# Patient Record
Sex: MALE | Race: BLACK OR AFRICAN AMERICAN | NOT HISPANIC OR LATINO | Employment: FULL TIME | URBAN - METROPOLITAN AREA
[De-identification: names, ages, dates, MRNs, and addresses within clinical notes are randomized per-mention and may not be internally consistent; named-entity substitution may affect disease eponyms.]

---

## 2018-08-02 ENCOUNTER — HOSPITAL ENCOUNTER (EMERGENCY)
Facility: HOSPITAL | Age: 38
End: 2018-08-03
Attending: EMERGENCY MEDICINE | Admitting: EMERGENCY MEDICINE
Payer: COMMERCIAL

## 2018-08-02 ENCOUNTER — APPOINTMENT (EMERGENCY)
Dept: CT IMAGING | Facility: HOSPITAL | Age: 38
End: 2018-08-02
Payer: COMMERCIAL

## 2018-08-02 DIAGNOSIS — K11.3 ABSCESS OF PAROTID GLAND: ICD-10-CM

## 2018-08-02 DIAGNOSIS — K11.20 PAROTIDITIS: Primary | ICD-10-CM

## 2018-08-02 LAB
ANION GAP SERPL CALCULATED.3IONS-SCNC: 8 MMOL/L (ref 4–13)
BASOPHILS # BLD AUTO: 0.1 THOUSANDS/ΜL (ref 0–0.1)
BASOPHILS NFR BLD AUTO: 1 % (ref 0–2)
BUN SERPL-MCNC: 12 MG/DL (ref 7–25)
CALCIUM SERPL-MCNC: 9.4 MG/DL (ref 8.6–10.5)
CHLORIDE SERPL-SCNC: 105 MMOL/L (ref 98–107)
CO2 SERPL-SCNC: 24 MMOL/L (ref 21–31)
CREAT SERPL-MCNC: 0.82 MG/DL (ref 0.7–1.3)
EOSINOPHIL # BLD AUTO: 0.4 THOUSAND/ΜL (ref 0–0.61)
EOSINOPHIL NFR BLD AUTO: 3 % (ref 0–5)
ERYTHROCYTE [DISTWIDTH] IN BLOOD BY AUTOMATED COUNT: 13.4 % (ref 11.5–14.5)
GFR SERPL CREATININE-BSD FRML MDRD: 131 ML/MIN/1.73SQ M
GLUCOSE SERPL-MCNC: 92 MG/DL (ref 65–99)
HCT VFR BLD AUTO: 42.4 % (ref 36.5–49.3)
HGB BLD-MCNC: 15 G/DL (ref 14–18)
LYMPHOCYTES # BLD AUTO: 2.6 THOUSANDS/ΜL (ref 0.6–4.47)
LYMPHOCYTES NFR BLD AUTO: 21 % (ref 21–51)
MCH RBC QN AUTO: 32.5 PG (ref 26–34)
MCHC RBC AUTO-ENTMCNC: 35.5 G/DL (ref 31–37)
MCV RBC AUTO: 92 FL (ref 81–99)
MONOCYTES # BLD AUTO: 1 THOUSAND/ΜL (ref 0.17–1.22)
MONOCYTES NFR BLD AUTO: 8 % (ref 2–12)
NEUTROPHILS # BLD AUTO: 8.3 THOUSANDS/ΜL (ref 1.4–6.5)
NEUTS SEG NFR BLD AUTO: 67 % (ref 42–75)
NRBC BLD AUTO-RTO: 0 /100 WBCS
PLATELET # BLD AUTO: 341 THOUSANDS/UL (ref 149–390)
PLATELET BLD QL SMEAR: ADEQUATE
PMV BLD AUTO: 7.5 FL (ref 8.6–11.7)
POTASSIUM SERPL-SCNC: 3.7 MMOL/L (ref 3.5–5.5)
RBC # BLD AUTO: 4.62 MILLION/UL (ref 4.3–5.9)
RBC MORPH BLD: NORMAL
SODIUM SERPL-SCNC: 137 MMOL/L (ref 134–143)
WBC # BLD AUTO: 12.3 THOUSAND/UL (ref 4.8–10.8)

## 2018-08-02 PROCEDURE — 96374 THER/PROPH/DIAG INJ IV PUSH: CPT

## 2018-08-02 PROCEDURE — 85025 COMPLETE CBC W/AUTO DIFF WBC: CPT | Performed by: EMERGENCY MEDICINE

## 2018-08-02 PROCEDURE — 70491 CT SOFT TISSUE NECK W/DYE: CPT

## 2018-08-02 PROCEDURE — 80048 BASIC METABOLIC PNL TOTAL CA: CPT | Performed by: EMERGENCY MEDICINE

## 2018-08-02 PROCEDURE — 36415 COLL VENOUS BLD VENIPUNCTURE: CPT | Performed by: EMERGENCY MEDICINE

## 2018-08-02 RX ORDER — KETOROLAC TROMETHAMINE 30 MG/ML
30 INJECTION, SOLUTION INTRAMUSCULAR; INTRAVENOUS ONCE
Status: COMPLETED | OUTPATIENT
Start: 2018-08-02 | End: 2018-08-02

## 2018-08-02 RX ADMIN — IOHEXOL 80 ML: 350 INJECTION, SOLUTION INTRAVENOUS at 22:50

## 2018-08-02 RX ADMIN — KETOROLAC TROMETHAMINE 30 MG: 30 INJECTION, SOLUTION INTRAMUSCULAR; INTRAVENOUS at 21:07

## 2018-08-03 ENCOUNTER — HOSPITAL ENCOUNTER (INPATIENT)
Facility: HOSPITAL | Age: 38
LOS: 1 days | Discharge: HOME | DRG: 115 | End: 2018-08-04
Attending: INTERNAL MEDICINE | Admitting: INTERNAL MEDICINE
Payer: COMMERCIAL

## 2018-08-03 VITALS
BODY MASS INDEX: 30.78 KG/M2 | RESPIRATION RATE: 18 BRPM | SYSTOLIC BLOOD PRESSURE: 116 MMHG | DIASTOLIC BLOOD PRESSURE: 70 MMHG | OXYGEN SATURATION: 96 % | TEMPERATURE: 98.4 F | HEIGHT: 70 IN | WEIGHT: 215 LBS | HEART RATE: 76 BPM

## 2018-08-03 DIAGNOSIS — R60.9 PAROTID SWELLING: ICD-10-CM

## 2018-08-03 DIAGNOSIS — L02.91 ABSCESS: Primary | ICD-10-CM

## 2018-08-03 PROBLEM — Z72.0 TOBACCO ABUSE: Status: ACTIVE | Noted: 2018-08-03

## 2018-08-03 LAB
ANION GAP SERPL CALCULATED.3IONS-SCNC: 8 MMOL/L (ref 4–13)
BUN SERPL-MCNC: 13 MG/DL (ref 5–25)
CALCIUM SERPL-MCNC: 9.2 MG/DL (ref 8.3–10.1)
CHLORIDE SERPL-SCNC: 104 MMOL/L (ref 100–108)
CO2 SERPL-SCNC: 24 MMOL/L (ref 21–32)
CREAT SERPL-MCNC: 1.12 MG/DL (ref 0.6–1.3)
ERYTHROCYTE [DISTWIDTH] IN BLOOD BY AUTOMATED COUNT: 13.8 % (ref 11.6–15.1)
GFR SERPL CREATININE-BSD FRML MDRD: 96 ML/MIN/1.73SQ M
GLUCOSE SERPL-MCNC: 100 MG/DL (ref 65–140)
HCT VFR BLD AUTO: 43.4 % (ref 36.5–49.3)
HGB BLD-MCNC: 15 G/DL (ref 12–17)
MCH RBC QN AUTO: 32.6 PG (ref 26.8–34.3)
MCHC RBC AUTO-ENTMCNC: 34.6 G/DL (ref 31.4–37.4)
MCV RBC AUTO: 94 FL (ref 82–98)
PLATELET # BLD AUTO: 317 THOUSANDS/UL (ref 149–390)
PMV BLD AUTO: 9.7 FL (ref 8.9–12.7)
POTASSIUM SERPL-SCNC: 4.1 MMOL/L (ref 3.5–5.3)
RBC # BLD AUTO: 4.6 MILLION/UL (ref 3.88–5.62)
SODIUM SERPL-SCNC: 136 MMOL/L (ref 136–145)
WBC # BLD AUTO: 13.18 THOUSAND/UL (ref 4.31–10.16)

## 2018-08-03 PROCEDURE — 87070 CULTURE OTHR SPECIMN AEROBIC: CPT | Performed by: OTOLARYNGOLOGY

## 2018-08-03 PROCEDURE — 0C983ZZ DRAINAGE OF RIGHT PAROTID GLAND, PERCUTANEOUS APPROACH: ICD-10-PCS | Performed by: OTOLARYNGOLOGY

## 2018-08-03 PROCEDURE — 87077 CULTURE AEROBIC IDENTIFY: CPT | Performed by: OTOLARYNGOLOGY

## 2018-08-03 PROCEDURE — 99285 EMERGENCY DEPT VISIT HI MDM: CPT

## 2018-08-03 PROCEDURE — 87205 SMEAR GRAM STAIN: CPT | Performed by: OTOLARYNGOLOGY

## 2018-08-03 PROCEDURE — 87181 SC STD AGAR DILUTION PER AGT: CPT | Performed by: OTOLARYNGOLOGY

## 2018-08-03 PROCEDURE — 85027 COMPLETE CBC AUTOMATED: CPT | Performed by: PHYSICIAN ASSISTANT

## 2018-08-03 PROCEDURE — 80048 BASIC METABOLIC PNL TOTAL CA: CPT | Performed by: PHYSICIAN ASSISTANT

## 2018-08-03 PROCEDURE — 99222 1ST HOSP IP/OBS MODERATE 55: CPT | Performed by: INTERNAL MEDICINE

## 2018-08-03 RX ORDER — SODIUM CHLORIDE 9 MG/ML
125 INJECTION, SOLUTION INTRAVENOUS CONTINUOUS
Status: DISCONTINUED | OUTPATIENT
Start: 2018-08-03 | End: 2018-08-04 | Stop reason: HOSPADM

## 2018-08-03 RX ORDER — LIDOCAINE HYDROCHLORIDE AND EPINEPHRINE 10; 10 MG/ML; UG/ML
5 INJECTION, SOLUTION INFILTRATION; PERINEURAL ONCE
Status: COMPLETED | OUTPATIENT
Start: 2018-08-03 | End: 2018-08-03

## 2018-08-03 RX ORDER — DEXAMETHASONE SODIUM PHOSPHATE 10 MG/ML
10 INJECTION, SOLUTION INTRAMUSCULAR; INTRAVENOUS EVERY 8 HOURS SCHEDULED
Status: COMPLETED | OUTPATIENT
Start: 2018-08-03 | End: 2018-08-04

## 2018-08-03 RX ORDER — ACETAMINOPHEN 325 MG/1
650 TABLET ORAL EVERY 6 HOURS PRN
Status: DISCONTINUED | OUTPATIENT
Start: 2018-08-03 | End: 2018-08-04 | Stop reason: HOSPADM

## 2018-08-03 RX ORDER — MORPHINE SULFATE 4 MG/ML
4 INJECTION, SOLUTION INTRAMUSCULAR; INTRAVENOUS
Status: DISCONTINUED | OUTPATIENT
Start: 2018-08-03 | End: 2018-08-04 | Stop reason: HOSPADM

## 2018-08-03 RX ORDER — ONDANSETRON 2 MG/ML
4 INJECTION INTRAMUSCULAR; INTRAVENOUS EVERY 6 HOURS PRN
Status: DISCONTINUED | OUTPATIENT
Start: 2018-08-03 | End: 2018-08-04 | Stop reason: HOSPADM

## 2018-08-03 RX ADMIN — SODIUM CHLORIDE 125 ML/HR: 9 INJECTION, SOLUTION INTRAVENOUS at 17:23

## 2018-08-03 RX ADMIN — LIDOCAINE HYDROCHLORIDE,EPINEPHRINE BITARTRATE 5 ML: 10; .01 INJECTION, SOLUTION INFILTRATION; PERINEURAL at 02:39

## 2018-08-03 RX ADMIN — SODIUM CHLORIDE 3 G: 9 INJECTION, SOLUTION INTRAVENOUS at 21:05

## 2018-08-03 RX ADMIN — SODIUM CHLORIDE 125 ML/HR: 9 INJECTION, SOLUTION INTRAVENOUS at 04:05

## 2018-08-03 RX ADMIN — SODIUM CHLORIDE 3 G: 9 INJECTION, SOLUTION INTRAVENOUS at 04:26

## 2018-08-03 RX ADMIN — SODIUM CHLORIDE 125 ML/HR: 9 INJECTION, SOLUTION INTRAVENOUS at 14:31

## 2018-08-03 RX ADMIN — SODIUM CHLORIDE 3 G: 9 INJECTION, SOLUTION INTRAVENOUS at 08:56

## 2018-08-03 RX ADMIN — DEXAMETHASONE SODIUM PHOSPHATE 10 MG: 10 INJECTION, SOLUTION INTRAMUSCULAR; INTRAVENOUS at 18:13

## 2018-08-03 RX ADMIN — SODIUM CHLORIDE 3 G: 9 INJECTION, SOLUTION INTRAVENOUS at 14:34

## 2018-08-03 RX ADMIN — DEXAMETHASONE SODIUM PHOSPHATE 10 MG: 10 INJECTION, SOLUTION INTRAMUSCULAR; INTRAVENOUS at 11:03

## 2018-08-03 NOTE — H&P
History and Physical - [age] [facility] Internal Medicine    Patient Information: Nuria Hoffman  41 y o  male MRN: [id number]  Unit/Bed#: ED 18 Encounter: [id number]  Admitting Physician: Magdi Herrera PA-C  PCP: No primary care provider on file  Date of Admission:  08/03/18    Assessment/Plan:    Hospital Problem List:     Principal Problem:    Parotid swelling  Active Problems:    Tobacco abuse      Plan for the Primary Problem(s):  · Parotid swelling  · Admit to med/surg  Seen by ENT in ED and had aspiration  Per Dr Liliya Montes keep NPO for now until repeat exam in AM  Start Unasyn  Plan for Additional Problems:   · Tobacco use- declined nicotine patch    VTE Prophylaxis: Pharmacologic VTE Prophylaxis contraindicated due to awaiting repeat ENT eval  / sequential compression device and reason for no mechanical VTE prophylaxis low risk   Code Status: full code  POLST: There is no POLST form on file for this patient (pre-hospital)    Anticipated Length of Stay:  Patient will be admitted on an Inpatient basis with an anticipated length of stay of  Greater than 2 midnights  Justification for Hospital Stay: patient requires IV antibiotics    Total Time for Visit, including Counseling / Coordination of Care: 45 minutes  Greater than 50% of this total time spent on direct patient counseling and coordination of care  Chief Complaint:   Sore throat    History of Present Illness:    Matthew Girard  is a 41 y o  male who presents with sore throat and swelling for 1 day  Denies fever  No trauma  He does have broken teeth and does not go to the dentist  He denies sick contacts  He denies frequent sore throats  He does smoke  He denies any other medical issues    Review of Systems:    Review of Systems   Constitutional: Negative  HENT: Positive for dental problem and sore throat  Eyes: Negative  Respiratory: Negative  Cardiovascular: Negative  Gastrointestinal: Negative  Endocrine: Negative  Genitourinary: Negative  Musculoskeletal: Negative  Skin: Negative  Allergic/Immunologic: Negative  Neurological: Negative  Hematological: Negative  Psychiatric/Behavioral: Negative  Past Medical and Surgical History:     History reviewed  No pertinent past medical history  Past Surgical History:   Procedure Laterality Date    APPENDECTOMY      JOINT REPLACEMENT         Meds/Allergies:    Prior to Admission medications    Not on File     I have reviewed home medications with patient personally  Allergies: No Known Allergies    Social History:     Marital Status:    Occupation: unemployed  Patient Pre-hospital Living Situation: lives with gabriela and her kids  Patient Pre-hospital Level of Mobility: ambulatory  Patient Pre-hospital Diet Restrictions: none  Substance Use History:   History   Alcohol Use No     History   Smoking Status    Current Every Day Smoker    Packs/day: 0 50   Smokeless Tobacco    Never Used     History   Drug Use    Types: Marijuana       Family History:    non-contributory    Physical Exam:     Vitals:   Blood Pressure: 130/80 (08/03/18 0152)  Pulse: 77 (08/03/18 0152)  Temperature: 97 8 °F (36 6 °C) (08/03/18 0152)  Temp Source: Oral (08/03/18 0152)  Respirations: 18 (08/03/18 0152)  SpO2: 97 % (08/03/18 0152)    Physical Exam   Constitutional: He is oriented to person, place, and time  He appears well-developed and well-nourished  No distress  HENT:   Head: Normocephalic and atraumatic  Poor dentition with broken teeth   Eyes: Conjunctivae are normal  Pupils are equal, round, and reactive to light  Neck: No tracheal deviation present  Right neck swelling  Able to swallow   Cardiovascular: Normal rate and regular rhythm  Pulmonary/Chest: Effort normal and breath sounds normal  No respiratory distress  He has no wheezes  Abdominal: Soft  Bowel sounds are normal  He exhibits no distension  There is no tenderness     Musculoskeletal: Normal range of motion  He exhibits no edema, tenderness or deformity  Neurological: He is alert and oriented to person, place, and time  Skin: Skin is warm and dry  No rash noted  He is not diaphoretic  No erythema  No pallor  Psychiatric: He has a normal mood and affect  His behavior is normal    Vitals reviewed  Additional Data:     Lab Results: I have personally reviewed pertinent reports  Results from last 7 days  Lab Units 08/02/18  2107   WBC Thousand/uL 12 30*   HEMOGLOBIN g/dL 15 0   HEMATOCRIT % 42 4   PLATELETS Thousands/uL 341   NEUTROS PCT % 67   LYMPHS PCT % 21   MONOS PCT % 8   EOS PCT % 3       Results from last 7 days  Lab Units 08/02/18  2144   SODIUM mmol/L 137   POTASSIUM mmol/L 3 7   CHLORIDE mmol/L 105   CO2 mmol/L 24   BUN mg/dL 12   CREATININE mg/dL 0 82   CALCIUM mg/dL 9 4   GLUCOSE RANDOM mg/dL 92           Imaging: I have personally reviewed pertinent reports  Ct Soft Tissue Neck W Contrast    Result Date: 8/2/2018  Narrative: INDICATION:  Neck mass, nonpulsatile, solitary  Right lateral neck discomfort with swelling and pain when eating solids  ORDERING PROVIDER:  Truong Encinas  TECHNIQUE:  CT of the soft tissues of the neck was performed with intravenous contrast   Omnipaque 350 80 mL was administered intravenously  Automated mA/kV exposure control was utilized and patient examination was performed in strict accordance with principles of ALARA  RADIATION AMOUNT:  332 3 mGy-cm  COMPARISON:  None Available  FINDINGS: There is a lesion in the right parotid gland image 35 series 2 which measures 1 9 x 1 7 cm in size  It appears to have decreased attenuation centrally  There are three presumed lymph nodes about the posterior aspect of the superficial lobe of the right parotid gland  Largest measures approximately 9 mm short axis dimension image 42 series 2    Small soft tissue lesion within the right parotid gland superficial lobe image 27 series 2 measures 5 mm in size  This is indeterminate  Similar smaller lesion noted within the superficial portion of left parotid gland  Mild overlying subcutaneous edema is present  Submandibular and thyroid glands are unremarkable  The airway is patent and symmetric throughout  There are no suspicious bony lesions  The visualized intracranial portions and lung apices are within normal limits  The visualized paranasal sinuses and mastoid air cells are clear  Impression: Mixed attenuation lesion within the superficial lobe of the right parotid gland  Adjacent presumed reactive lymph nodes are present  Adjacent edema is present  Clinical correlation for signs of infection recommended  Findings are suspected to be related to parotiditis with abscess  ENT consultation suggested  Small solid density is noted within the parotid glands bilaterally which are indeterminate, but likely benign parotid lesions  Signed by Adriana Mittal MD      EKG, Pathology, and Other Studies Reviewed on Admission:   · EKG: none    Allscripts / Epic Records Reviewed: Yes     ** Please Note: This note has been constructed using a voice recognition system   **

## 2018-08-03 NOTE — ED NOTES
Dr Sebas Alston contacted through office answering service  Aware that patient has arrived in ED  Per answering service, Dr Sebas Alston will be in soon to see patient        Arlet Oconnell RN  08/03/18 3152

## 2018-08-03 NOTE — EMTALA/ACUTE CARE TRANSFER
[address] [facility] [address]  [address] [place] [address] [address]  [phone]  Dept: [phone]      [address] TRANSFER CONSENT    NAME Matthew Urbina   1980                              MRN [id number]    I have been informed of my rights regarding examination, treatment, and transfer   by Dr Radha France MD    Benefits: Specialized equipment and/or services available at the receiving facility (Include comment)________________________    Risks: Potential for delay in receiving treatment, Potential deterioration of medical condition, Increased discomfort during transfer, Possible worsening of condition or death during transfer      Consent for Transfer:  I acknowledge that my medical condition has been evaluated and explained to me by the emergency department physician or other qualified medical person and/or my attending physician, who has recommended that I be transferred to the service of  Accepting Physician: DR OWUSU [facility] TRANSFER)/DR SOLORZANO(IN ADMIT) at [address] Name, [facility]  The above potential benefits of such transfer, the potential risks associated with such transfer, and the probable risks of not being transferred have been explained to me, and I fully understand them  The doctor has explained that, in my case, the benefits of transfer outweigh the risks  I agree to be transferred  I authorize the performance of emergency medical procedures and treatments upon me in both transit and upon arrival at the receiving facility  Additionally, I authorize the release of any and all medical records to the receiving facility and request they be transported with me, if possible  I understand that the safest mode of transportation during a medical emergency is an ambulance and that the Hospital advocates the use of this mode of transport   Risks of traveling to the receiving facility by car, including absence of medical control, life sustaining equipment, such as oxygen, and medical personnel has been explained to me and I fully understand them  (LOYDA CORRECT BOX BELOW)  [  ]  I consent to the stated transfer and to be transported by ambulance/helicopter  [  Demond Pascual consent to the stated transfer, but refuse transportation by ambulance and accept full responsibility for my transportation by car  I understand the risks of non-ambulance transfers and I exonerate the Hospital and its staff from any deterioration in my condition that results from this refusal     X___________________________________________    DATE  18  TIME________  Signature of patient or legally responsible individual signing on patient behalf           RELATIONSHIP TO PATIENT_________________________          Provider Certification    NAME Matthew Ruvalcaba   1980                              MRN [id number]    A medical screening exam was performed on the above named patient  Based on the examination:    Condition Necessitating Transfer The primary encounter diagnosis was Parotiditis  A diagnosis of Abscess of parotid gland was also pertinent to this visit      Patient Condition: The patient has been stabilized such that within reasonable medical probability, no material deterioration of the patient condition or the condition of the unborn child(lisa) is likely to result from the transfer    Reason for Transfer: Level of Care needed not available at this facility    Transfer Requirements: [address] [address]   · Space available and qualified personnel available for treatment as acknowledged by    · Agreed to accept transfer and to provide appropriate medical treatment as acknowledged by       DR OWUSU [facility] TRANSFER)/DR SOLORZANO(IN ADMIT)  · Appropriate medical records of the examination and treatment of the patient are provided at the time of transfer   [address] _______  · Transfer will be performed by qualified personnel from    and appropriate transfer equipment as required, including the use of necessary and appropriate life support measures  Provider Certification: I have examined the patient and explained the following risks and benefits of being transferred/refusing transfer to the patient/family:  General risk, such as traffic hazards, adverse weather conditions, rough terrain or turbulence, possible failure of equipment (including vehicle or aircraft), or consequences of actions of persons outside the control of the transport personnel      Based on these reasonable risks and benefits to the patient and/or the unborn child(lisa), and based upon the information available at the time of the patients examination, I certify that the medical benefits reasonably to be expected from the provision of appropriate medical treatments at another medical facility outweigh the increasing risks, if any, to the individuals medical condition, and in the case of labor to the unborn child, from effecting the transfer      X____________________________________________ DATE 08/03/18        TIME_______      ORIGINAL - SEND TO MEDICAL RECORDS   COPY - SEND WITH PATIENT DURING TRANSFER

## 2018-08-03 NOTE — CONSULTS
Consultation - ENT   Luke May  41 y o  male MRN: [id number]  Unit/Bed#: ED 18 Encounter: [id number]      Assessment/Plan     Assessment:  Right parotitis with right parotid abscess  Plan:  Patient will be admitted with IV antibiotic therapy  At this point I do not feel that he needs to be taken to the operating room  I attempted bedside aspiration with skin can't evidence of pus using an 18 gauge needle and trying to aspirate in approximately 6 locations  At this time he will remain NPO until evaluated by Dr Bill Munoz in the morning to make sure that there is clinical improvement  [facility] service was  contacted and they will admit to their service  He will require IV fluids and IV Unasyn and we will see the response  Physician Requesting Consult: No att  providers found  Reason for Consult / Principal Problem:  Right parotid abscess  HPI: Luke May  is a 41y o  year old male who presents with a 2 day history of right-sided neck pain  Today when he awoke there was significant swelling in the area anterior and inferior to the right ear and he was having difficulty opening his mouth  He went to the emergency room earlier [facility] [place], operated by [facility] and a CT scan was completed  This demonstrated a 1 9 x 1 7 cm abscess in the right parotid gland  Was called by the transfer center and accepted the transfer  Consults    Review of Systems   Constitutional:        Admits to right ear and neck pain along with difficulty opening his mouth  HENT: Positive for ear discharge (Right-sided)  Negative for sore throat, trouble swallowing and voice change  Historical Information   History reviewed  No pertinent past medical history    Past Surgical History:   Procedure Laterality Date    APPENDECTOMY      JOINT REPLACEMENT       Social History   History   Alcohol Use No     History   Drug Use    Types: Marijuana     History   Smoking Status    Current Every Day Smoker    Packs/day: 0 50   Smokeless Tobacco    Never Used     Family History: History reviewed  No pertinent family history  Meds/Allergies   current meds:   Current Facility-Administered Medications   Medication Dose Route Frequency    acetaminophen (TYLENOL) tablet 650 mg  650 mg Oral Q6H PRN    ondansetron (ZOFRAN) injection 4 mg  4 mg Intravenous Q6H PRN    sodium chloride 0 9 % infusion  100 mL/hr Intravenous Continuous    and PTA meds:   None       No Known Allergies    Objective     No intake or output data in the 24 hours ending 08/03/18 0309    Invasive Devices     Peripheral Intravenous Line            Peripheral IV 08/02/18 Left Hand less than 1 day                Physical Exam   Constitutional: He appears well-developed and well-nourished  No distress  HENT:   Ears:  External canals and ears are normal  Tympanic membranes are intact without effusion, retraction, or perforation  Nasal:  External nose is normal   Septum is midline  There is no mucopus in the nose  Turbinates are normal in color and shape  Mucous membranes are moist     Oral cavity:  Lips are normal   There is evidence of trismus and the patient can open the mouth approximately 1 5 fingerbreadths  Dentition demonstrates some discoloration  The entire left lower role of molars is ground down completely  On the right lower molar region there is a cracked tooth  Upper dentition is in good repair  Gingival surfaces are all normal there is no swelling or redness of the sites  Floor of mouth is soft  There is good mobility of the tongue  Gingival sulcus is normal   There is almost no saliva from any of the salivary ducts  No expression of pus from the parotid duct itself when it was palpated  Soft palate is normal uvula is midline  Tonsils are grade 2  Posterior pharynx is normal without any mucopus draining from above  Neck: Normal range of motion  No JVD present  No tracheal deviation present  No thyromegaly present  Right side of the neck with visible swelling in the pre and infra-auricular regions  There is tenderness when palpated along the angle of the jaw  There is no ballotable lesion  The skin is not erythematous  He does have pain with palpation in the region  Pulmonary/Chest: No stridor  Skin: He is not diaphoretic  Consent was signed and charted for aspiration of the right parotid bed abscess  The area was cleansed with alcohol and then 1% xylocaine with 1 to 911562 epinephrine was injected into the site to a total of approximately 2 5 mL  Then using a 5 mL syringe with an 18 gauge needle I attempted to aspirate pus from the parotid bed  Despite 3 attempts with multiple directions of angulation of the needle I was only able to aspirate a very scant amount of mucopus into the syringe  This was sent for both aerobic and anaerobic cultures  Pressure was held on the wound after completion and there was no evidence of bleeding or sialocele formation  Lab Results:   CBC:   Lab Results   Component Value Date    WBC 12 30 (H) 08/02/2018    HGB 15 0 08/02/2018    HCT 42 4 08/02/2018    MCV 92 08/02/2018     08/02/2018    MCH 32 5 08/02/2018    MCHC 35 5 08/02/2018    RDW 13 4 08/02/2018    MPV 7 5 (L) 08/02/2018    NRBC 0 08/02/2018   , CMP:   Lab Results   Component Value Date     08/02/2018    K 3 7 08/02/2018     08/02/2018    CO2 24 08/02/2018    ANIONGAP 8 08/02/2018    BUN 12 08/02/2018    CREATININE 0 82 08/02/2018    GLUCOSE 92 08/02/2018    CALCIUM 9 4 08/02/2018    EGFR 131 08/02/2018   , Coags: No results found for: PT, PTT, INR  Imaging Studies: I have personally reviewed pertinent films in PACS  EKG, Pathology, and Other Studies: I have personally reviewed pertinent reports        Code Status: No Order  Advance Directive and Living Will:      Power of :    POLST:      None

## 2018-08-03 NOTE — ED PROVIDER NOTES
History  Chief Complaint   Patient presents with    Sore Throat - Complicated    Neck Swelling     38 YOM WITH SWELLING AND PAIN TO THE RIGHT SIDE OF FACE AND NECK  FOR THE PAST 24 HOURS  NO TRAUMA OR DENTAL PAIN  NO SOB  ASSOCIATED DYSPHAGIA            None       History reviewed  No pertinent past medical history  Past Surgical History:   Procedure Laterality Date    APPENDECTOMY      JOINT REPLACEMENT         History reviewed  No pertinent family history  I have reviewed and agree with the history as documented  Social History   Substance Use Topics    Smoking status: Current Every Day Smoker     Packs/day: 0 50    Smokeless tobacco: Never Used    Alcohol use No        Review of Systems   Constitutional: Negative for chills and fever  HENT: Positive for facial swelling and trouble swallowing  Negative for dental problem, drooling, ear pain, rhinorrhea and sore throat  Eyes: Negative for pain, redness and visual disturbance  Respiratory: Negative for cough and shortness of breath  Cardiovascular: Negative for chest pain and leg swelling  Gastrointestinal: Negative for abdominal pain, diarrhea, nausea and vomiting  Genitourinary: Negative for dysuria, flank pain, frequency and urgency  Musculoskeletal: Negative for back pain, myalgias and neck pain  Skin: Negative for rash  Neurological: Negative for dizziness, weakness, light-headedness and headaches  Hematological: Negative  Psychiatric/Behavioral: Negative for agitation, confusion and suicidal ideas  The patient is not nervous/anxious  All other systems reviewed and are negative  Physical Exam  Physical Exam   HENT:   THERE IS A TENDER LARGE POORLY DEFINED MASS TO THE RIGHT SUBMANDIBULAR ANGLE WITHOUT OVERLYING DERMAL CHANGES  THE OC EXAM IS LIMITED BY JAW EXCURSION  THE DENTITION BY LIMITED EXAM ARE UNREMARKABLE   Neck: Normal range of motion  Neck supple         Vital Signs  ED Triage Vitals [08/02/18 1935] Temperature Pulse Respirations Blood Pressure SpO2   99 °F (37 2 °C) 105 18 147/89 96 %      Temp Source Heart Rate Source Patient Position - Orthostatic VS BP Location FiO2 (%)   Temporal Monitor Sitting Right arm --      Pain Score       Worst Possible Pain           Vitals:    08/02/18 1935 08/02/18 2030 08/02/18 2230   BP: 147/89 121/66 108/63   Pulse: 105 89 75   Patient Position - Orthostatic VS: Sitting         Visual Acuity      ED Medications  Medications   ketorolac (TORADOL) injection 30 mg (30 mg Intravenous Given 8/2/18 2107)   iohexol (OMNIPAQUE) 350 MG/ML injection (SINGLE-DOSE) 100 mL (80 mL Intravenous Given 8/2/18 2250)       Diagnostic Studies  Results Reviewed     Procedure Component Value Units Date/Time    Basic metabolic panel [[id number]] Collected:  08/02/18 2144    Lab Status:  Final result Specimen:  Blood from Arm, Left Updated:  08/02/18 2223     Sodium 137 mmol/L      Potassium 3 7 mmol/L      Chloride 105 mmol/L      CO2 24 mmol/L      Anion Gap 8 mmol/L      BUN 12 mg/dL      Creatinine 0 82 mg/dL      Glucose 92 mg/dL      Calcium 9 4 mg/dL      eGFR 131 ml/min/1 73sq m     Narrative:         National Kidney Disease Education Program recommendations are as follows:  GFR calculation is accurate only with a steady state creatinine  Chronic Kidney disease less than 60 ml/min/1 73 sq  meters  Kidney failure less than 15 ml/min/1 73 sq  meters      CBC and differential [[id number]]  (Abnormal) Collected:  08/02/18 2107    Lab Status:  Final result Specimen:  Blood from Arm, Left Updated:  08/02/18 2129     WBC 12 30 (H) Thousand/uL      RBC 4 62 Million/uL      Hemoglobin 15 0 g/dL      Hematocrit 42 4 %      MCV 92 fL      MCH 32 5 pg      MCHC 35 5 g/dL      RDW 13 4 %      MPV 7 5 (L) fL      Platelets 465 Thousands/uL      nRBC 0 /100 WBCs      Neutrophils Relative 67 %      Lymphocytes Relative 21 %      Monocytes Relative 8 %      Eosinophils Relative 3 %      Basophils Relative 1 % Neutrophils Absolute 8 30 (H) Thousands/µL      Lymphocytes Absolute 2 60 Thousands/µL      Monocytes Absolute 1 00 Thousand/µL      Eosinophils Absolute 0 40 Thousand/µL      Basophils Absolute 0 10 Thousands/µL                  CT soft tissue neck w contrast    (Results Pending)              Procedures  Procedures       Phone Contacts  ED Phone Contact    ED Course   AT 12:00AM, THE CT OF NECK SOFT TISSUES RETURNED SHOWING PAROTIDITIS WITH AN ABSCESS  THE PATIENT DEMONSTRATES DYSPHAGIA AND TRISMUS  WBCs 12 3 LOW GRADE TEMPERATURE  CLINICAL AND RADIOLOGIC FINDINGS SUGGEST INPATIENT TREATMENT    AT 12:00AM, A TRANSFER REQUEST WAS INITIATED  [facility] [place] Time    Disposition  Final diagnoses:   None     ED Disposition     None      Follow-up Information    None         Patient's Medications    No medications on file     No discharge procedures on file      ED Provider  Electronically Signed by           Minor Gamboa MD  08/02/18 8393       Minor Gamboa MD  08/03/18 6807

## 2018-08-03 NOTE — PROGRESS NOTES
Progress Note - ENT Surgery   Matthew Aguilar Finders  41 y o  male MRN: [id number]  Unit/Bed#: E5 -01 Encounter: [id number]    Assessment:  Right parotitis, ? Abscess vs infected cyst vs intraparotid lymph node    Plan:  Patient s/p FNA with scant thick discharge recovered earlier this AM  Some confusion with specimen but lab/micro was contacted by nurse while I was present and specimen was accepted and C&S is pending  Patient does not require I&D at this time  Will follow response to medical therapy   Trismus improved and patient reports decreased pain since admission  Continue IV antibiotics  Add Decadron 10 mg q 8 hours x 3 doses  Advance patient to regular diet  Subjective/Objective   Chief Complaint: Right parotid swelling x 2 days    Subjective: Decreased pain  Patient is hungry  Objective:     Blood pressure 119/77, pulse 73, temperature 99 °F (37 2 °C), temperature source Temporal, resp  rate 18, height 5' 10" (1 778 m), weight 100 kg (220 lb 7 4 oz), SpO2 93 %  ,Body mass index is 31 63 kg/m²  No intake or output data in the 24 hours ending 08/03/18 0929    Invasive Devices     Peripheral Intravenous Line            Peripheral IV 08/02/18 Left Hand less than 1 day                Physical Exam: Head: Normocephalic, without obvious abnormality, atraumatic  Eyes: conjunctivae/corneas clear  PERRL, EOM's intact  Fundi benign  Ears: normal TM's and external ear canals both ears  Nose: no discharge, turbinates normal  Throat: abnormal findings: dentition: poor dentition, Trismus improved with at least 2 finger breath width this AM    Neck: mild anterior cervical adenopathy and Right parotid tail indurated with no fluctuance; mildly tender to palpation  No rubor  Trachea midline, no stridor, no labored breathing    Lab, Imaging and other studies:I have personally reviewed pertinent lab results    Slight increase in WBC s/p FNA early this AM

## 2018-08-04 VITALS
SYSTOLIC BLOOD PRESSURE: 161 MMHG | WEIGHT: 220.46 LBS | HEART RATE: 84 BPM | TEMPERATURE: 98 F | RESPIRATION RATE: 18 BRPM | OXYGEN SATURATION: 96 % | DIASTOLIC BLOOD PRESSURE: 89 MMHG | BODY MASS INDEX: 31.56 KG/M2 | HEIGHT: 70 IN

## 2018-08-04 PROCEDURE — 99238 HOSP IP/OBS DSCHRG MGMT 30/<: CPT | Performed by: INTERNAL MEDICINE

## 2018-08-04 RX ORDER — PREDNISONE 10 MG/1
TABLET ORAL
Qty: 20 TABLET | Refills: 0 | Status: SHIPPED | OUTPATIENT
Start: 2018-08-04

## 2018-08-04 RX ORDER — AMOXICILLIN AND CLAVULANATE POTASSIUM 875; 125 MG/1; MG/1
1 TABLET, FILM COATED ORAL EVERY 12 HOURS SCHEDULED
Qty: 20 TABLET | Refills: 0 | Status: SHIPPED | OUTPATIENT
Start: 2018-08-04 | End: 2018-08-14

## 2018-08-04 RX ADMIN — SODIUM CHLORIDE 3 G: 9 INJECTION, SOLUTION INTRAVENOUS at 02:50

## 2018-08-04 RX ADMIN — DEXAMETHASONE SODIUM PHOSPHATE 10 MG: 10 INJECTION, SOLUTION INTRAMUSCULAR; INTRAVENOUS at 02:50

## 2018-08-04 RX ADMIN — SODIUM CHLORIDE 125 ML/HR: 9 INJECTION, SOLUTION INTRAVENOUS at 02:10

## 2018-08-04 RX ADMIN — SODIUM CHLORIDE 3 G: 9 INJECTION, SOLUTION INTRAVENOUS at 09:41

## 2018-08-04 NOTE — DISCHARGE SUMMARY
Discharge- Luke May  1980, 41 y o  male MRN: [id number]    Unit/Bed#: E5 -01 Encounter: [id number]      Admitting Provider:  Zoila Montiel MD  Discharge Provider:  Florin Abebe DO  Admission Date: 8/3/2018       Discharge Date: 08/04/18   LOS: 1  Primary Care Physician at Discharge:  None    HOSPITAL COURSE:  Luke May  is a 41 y o  male who presented the hospital sore throat and facial swelling x1 day  He had broken teeth and attributed that  In the emergency department he obtain CT scan and was found to have parotitis and had trismus  He was started on Unasyn and eventually on dexamethasone by ENT  He had much improvement without need for evacuation or drainage  At time of discharge he has been tolerating solid meals without difficulty  He understands the importance of following up with ENT within the next week  DISCHARGE DIAGNOSES    Principal Problem:    Parotid swelling  Active Problems:    Tobacco abuse  Resolved Problems:    * No resolved hospital problems  Kristina Render   Dr Khan Core - ENT    PROCEDURES PERFORMED  Ct Soft Tissue Neck W Contrast  Result Date: 8/2/2018  Impression: Mixed attenuation lesion within the superficial lobe of the right parotid gland  Adjacent presumed reactive lymph nodes are present  Adjacent edema is present  Clinical correlation for signs of infection recommended  Findings are suspected to be related to parotiditis with abscess  ENT consultation suggested  Small solid density is noted within the parotid glands bilaterally which are indeterminate, but likely benign parotid lesions   Signed by Chris Bocanegra MD    Other Pertinent Test Results    Results from last 7 days  Lab Units 08/03/18  0456 08/02/18  2107   WBC Thousand/uL 13 18* 12 30*   HEMOGLOBIN g/dL 15 0 15 0   HEMATOCRIT % 43 4 42 4   MCV fL 94 92   PLATELETS Thousands/uL 317 341       Results from last 7 days  Lab Units 08/03/18  0456 08/02/18  2144   SODIUM mmol/L 136 137   POTASSIUM mmol/L 4 1 3 7   CHLORIDE mmol/L 104 105   CO2 mmol/L 24 24   ANION GAP mmol/L 8 8   BUN mg/dL 13 12   CREATININE mg/dL 1 12 0 82   CALCIUM mg/dL 9 2 9 4   EGFR ml/min/1 73sq m 96 131   GLUCOSE RANDOM mg/dL 100 92         Results from last 7 days  Lab Units 08/03/18  0324 08/03/18  0323   GRAM STAIN RESULT  1+ Mononuclear Cells  2+ Gram positive cocci in pairs  1+ Gram positive cocci in clusters  Gram positive cocci in chains 3+ Polys  No bacteria seen   WOUND CULTURE  Culture too young- will reincubate Culture too young- will reincubate       PHYSICAL EXAM:  Vitals:   Blood Pressure: 161/89 (08/04/18 0736)  Pulse: 84 (08/04/18 0736)  Temperature: 98 °F (36 7 °C) (08/04/18 0736)  Temp Source: Temporal (08/04/18 0736)  Respirations: 18 (08/04/18 0736)  Height: 5' 10" (177 8 cm) (08/03/18 0352)  Weight - Scale: 100 kg (220 lb 7 4 oz) (08/03/18 0352)  SpO2: 96 % (08/04/18 0736)    General appearance: alert, appears stated age and cooperative  Head: atraumatic  Eyes: conjunctivae/corneas clear  PERRL, EOM's intact  Lungs: clear to auscultation bilaterally  Heart: regular rate and rhythm  Abdomen: soft, non-tender; bowel sounds normal; no masses,  no organomegaly  Back: symmetric, no curvature  ROM normal  No CVA tenderness  Extremities: extremities normal, atraumatic, no cyanosis or edema  Neurologic: Grossly normal    Planned Re-admission: No  Discharge Disposition: Home      Test Results Pending at Discharge:    Order Current Status    Wound culture and Gram stain Preliminary result    Wound culture and Gram stain Preliminary result        Incidental findings: No    Medications   Please see After Visit Summary for reconciled discharge medications provided to patient and family  Diet restrictions: Regular diet   Activity restrictions: No strenuous activity  Discharge Condition: good    Outpatient Follow-Up  1   Litzy Nicole DO ENT - follow-up within one week    Code Status: Level 1 - Full Code  Discharge Statement   I spent 30 minutes discharging the patient  This time was spent on the day of discharge  Greater than 50% of total time was spent with the patient and / or family counseling and / or coordination of care  ** Please Note: This note has been constructed using a voice recognition system   **

## 2018-08-04 NOTE — NURSING NOTE
Patient given discharge instructions and prescriptions  Patient given information and education on smoking cessation  Patient took all belongings

## 2018-08-06 LAB
BACTERIA WND AEROBE CULT: ABNORMAL
BACTERIA WND AEROBE CULT: ABNORMAL
GRAM STN SPEC: ABNORMAL

## 2018-08-06 NOTE — CASE MANAGEMENT
Initial Clinical Review    TRANSFER FROM  [facility]  Admission: Date/Time/Statement: 8/3/18 @ 0309     Orders Placed This Encounter   Procedures    Inpatient Admission     Standing Status:   Standing     Number of Occurrences:   1     Order Specific Question:   Admitting Physician     Answer:   Magdalena Pepe     Order Specific Question:   Level of Care     Answer:   Med Surg [16]     Order Specific Question:   Estimated length of stay     Answer:   More than 2 Midnights     Order Specific Question:   Certification     Answer:   I certify that inpatient services are medically necessary for this patient for a duration of greater than two midnights  See H&P and MD Progress Notes for additional information about the patient's course of treatment  ED: Date/Time/Mode of Arrival:   ED Arrival Information     Expected Arrival Acuity Means of Arrival Escorted By Service Admission Type    8/3/2018 01:23 8/3/2018 01:50 Urgent Amb[facility] Medicine Urgent    Arrival Complaint    -          Chief Complaint:   Chief Complaint   Patient presents with    Sore Throat - Complicated     pt started with sore throat yesterday  pain and swelling on right side worsening  Pt states able to eat and drink  no resp distress  Transfered from [facility] for private exam for Dr Uli Dillon        History of Illness: Arron Melendez  is a 41 y o  male who presents with sore throat and swelling for 1 day  Denies fever  No trauma  He does have broken teeth and does not go to the dentist  He denies sick contacts  He denies frequent sore throats  He does smoke   He denies any other medical issues       ED Vital Signs:   ED Triage Vitals [08/03/18 0152]   Temperature Pulse Respirations Blood Pressure SpO2   97 8 °F (36 6 °C) 77 18 130/80 97 %      Temp Source Heart Rate Source Patient Position - Orthostatic VS BP Location FiO2 (%)   Oral Monitor Sitting Right arm --      Pain Score       7        Wt Readings from Last 1 Encounters:   08/03/18 100 kg (220 lb 7 4 oz)       Vital Signs (abnormal):    above    Abnormal Labs/Diagnostic Test Results:   CT  Soft  tissue  Of  Neck:  Mixed attenuation lesion within the superficial lobe of the right parotid  gland   Adjacent presumed reactive lymph nodes are present   Adjacent  edema is present   Clinical correlation for signs of infection  recommended   Findings are suspected to be related to parotiditis with  abscess   ENT consultation suggested  Small solid density is noted within  the parotid glands bilaterally which are indeterminate, but likely benign  parotid lesions  WBC   12 30  Abs  neutro    8 30    ED Treatment:   Medication Administration from 08/03/2018 0123 to 08/03/2018 9644       Date/Time Order Dose Route Action Action by Comments     08/03/2018 0239 lidocaine-epinephrine (XYLOCAINE/EPINEPHRINE) 1 %-1:100,000 injection 5 mL 5 mL Infiltration Given Arianna Bond DO           Past Medical/Surgical History: Active Ambulatory Problems     Diagnosis Date Noted    No Active Ambulatory Problems     Resolved Ambulatory Problems     Diagnosis Date Noted    No Resolved Ambulatory Problems     No Additional Past Medical History       Admitting Diagnosis: Abscess [L02 91]  Abdominal pain [R10 9]    Age/Sex: 41 y o  male    · Assessment/Plan: Parotid swelling  ? Admit to med/surg  Seen by ENT in ED and had aspiration   Per Dr Gregorio Silverman keep NPO for now until repeat exam in AM  Start Unasyn       Plan for Additional Problems:   · Tobacco use- declined nicotine patch     VTE Prophylaxis: Pharmacologic VTE Prophylaxis contraindicated due to awaiting repeat ENT eval  / sequential compression device and reason for no mechanical VTE prophylaxis low risk   Code Status: full code  POLST: There is no POLST form on file for this patient (pre-hospital)     Anticipated Length of Stay:  Patient will be admitted on an Inpatient basis with an anticipated length of stay of Greater than 2 midnights  Justification for Hospital Stay: patient requires IV antibiotics    Admission Orders:   IP    8/3  @   0310  Scheduled Meds:   Continuous Infusions:   No current facility-administered medications for this encounter  PRN Meds:     Wound  C/s  IV  unasyn  Q 6 hrs  IV  Decadron Q 8 hrs  IVF  125/hr    PER  ENT  CONSULT:  Assessment:  Right parotitis with right parotid abscess  Plan:  Patient will be admitted with IV antibiotic therapy  At this point I do not feel that he needs to be taken to the operating room  I attempted bedside aspiration with skin can't evidence of pus using an 18 gauge needle and trying to aspirate in approximately 6 locations  At this time he will remain NPO until evaluated by Dr Sonya Ly in the morning to make sure that there is clinical improvement  [facility] service was  contacted and they will admit to their service  He will require IV fluids and IV Unasyn and we will see the response  Patient s/p FNA with scant thick discharge recovered earlier this AM  Some confusion with specimen but lab/micro was contacted by nurse while I was present and specimen was accepted and C&S is pending  Patient does not require I&D at this time  Will follow response to medical therapy   Trismus improved and patient reports decreased pain since admission  Continue IV antibiotics  Add Decadron 10 mg q 8 hours x 3 doses  Advance patient to regular diet  PT  D/C  HOME     8/4      Thank you,  Caity Daniels  [address] Util[facility] Review Department  Phone: [phone]; Fax [phone]  ATTENTION: The Network Utilization Review Department is now centralized for our 9 Facilities  Make a note that we have a new phone and fax numbers for our Department  Please call with any questions or concerns to [phone] and carefully follow the prompts so that you are directed to the right person   All voicemails are confidential  Fax any determinations, approvals, denials, and requests for initial or continue stay review clinical to [phone]   Due to HIGH CALL volume, it would be easier if you could please send faxed requests to expedite your requests and in part, help us provide discharge notifications faster

## 2019-12-09 ENCOUNTER — HOSPITAL ENCOUNTER (EMERGENCY)
Facility: HOSPITAL | Age: 39
Discharge: HOME | End: 2019-12-09
Attending: FAMILY MEDICINE | Admitting: FAMILY MEDICINE

## 2019-12-09 VITALS
DIASTOLIC BLOOD PRESSURE: 78 MMHG | OXYGEN SATURATION: 98 % | HEART RATE: 110 BPM | SYSTOLIC BLOOD PRESSURE: 134 MMHG | RESPIRATION RATE: 16 BRPM | TEMPERATURE: 98 F

## 2019-12-09 DIAGNOSIS — L02.91 ABSCESS: Primary | ICD-10-CM

## 2019-12-09 PROCEDURE — 99284 EMERGENCY DEPT VISIT MOD MDM: CPT | Performed by: PHYSICIAN ASSISTANT

## 2019-12-09 PROCEDURE — 10060 I&D ABSCESS SIMPLE/SINGLE: CPT | Performed by: PHYSICIAN ASSISTANT

## 2019-12-09 PROCEDURE — 99282 EMERGENCY DEPT VISIT SF MDM: CPT

## 2019-12-09 RX ORDER — SULFAMETHOXAZOLE AND TRIMETHOPRIM 800; 160 MG/1; MG/1
1 TABLET ORAL 2 TIMES DAILY
Qty: 20 TABLET | Refills: 0 | Status: SHIPPED | OUTPATIENT
Start: 2019-12-09 | End: 2019-12-19

## 2019-12-09 RX ORDER — CEPHALEXIN 500 MG/1
500 CAPSULE ORAL 4 TIMES DAILY
Qty: 40 CAPSULE | Refills: 0 | Status: SHIPPED | OUTPATIENT
Start: 2019-12-09 | End: 2019-12-19

## 2019-12-09 RX ORDER — LIDOCAINE HYDROCHLORIDE AND EPINEPHRINE 10; 10 MG/ML; UG/ML
10 INJECTION, SOLUTION INFILTRATION; PERINEURAL ONCE
Status: COMPLETED | OUTPATIENT
Start: 2019-12-09 | End: 2019-12-09

## 2019-12-09 RX ADMIN — LIDOCAINE HYDROCHLORIDE,EPINEPHRINE BITARTRATE 10 ML: 10; .01 INJECTION, SOLUTION INFILTRATION; PERINEURAL at 17:46

## 2020-01-20 ENCOUNTER — OFFICE VISIT (OUTPATIENT)
Dept: URGENT CARE | Facility: CLINIC | Age: 40
End: 2020-01-20

## 2020-01-20 VITALS
OXYGEN SATURATION: 97 % | RESPIRATION RATE: 18 BRPM | DIASTOLIC BLOOD PRESSURE: 85 MMHG | HEART RATE: 103 BPM | SYSTOLIC BLOOD PRESSURE: 146 MMHG | TEMPERATURE: 97.4 F

## 2020-01-20 DIAGNOSIS — J02.9 SORE THROAT: Primary | ICD-10-CM

## 2020-01-20 LAB — S PYO AG THROAT QL: NEGATIVE

## 2020-01-20 PROCEDURE — 87880 STREP A ASSAY W/OPTIC: CPT | Performed by: PHYSICIAN ASSISTANT

## 2020-01-20 PROCEDURE — 99213 OFFICE O/P EST LOW 20 MIN: CPT | Performed by: PHYSICIAN ASSISTANT

## 2020-01-20 PROCEDURE — 87070 CULTURE OTHR SPECIMN AEROBIC: CPT | Performed by: PHYSICIAN ASSISTANT

## 2020-01-20 RX ORDER — BROMPHENIRAMINE MALEATE, PSEUDOEPHEDRINE HYDROCHLORIDE, AND DEXTROMETHORPHAN HYDROBROMIDE 2; 30; 10 MG/5ML; MG/5ML; MG/5ML
5 SYRUP ORAL 4 TIMES DAILY PRN
Qty: 118 ML | Refills: 0 | Status: SHIPPED | OUTPATIENT
Start: 2020-01-20

## 2020-01-20 NOTE — PROGRESS NOTES
[facility]        NAME: Rosa Reveles  is a 46 y o  male  : 1980    MRN: [id number]  DATE: 2020  TIME: 9:46 AM    Assessment and Plan   Sore throat [J02 9]  1  Sore throat  Throat culture    POCT rapid strepA    brompheniramine-pseudoephedrine-DM 30-2-10 MG/5ML syrup         Patient Instructions   Patient Instructions     Negative rapid strep  Benign exam with no fever  Viral/ possible adenovirus  Bromfed for cough and congestion  Continue Tylenol and Motrin for body aches and fever  We will check a throat culture  Call in 2 days for the culture results  Follow up with PCP in 3-5 days  Proceed to  ER if symptoms worsen  Chief Complaint     Chief Complaint   Patient presents with    Sore Throat     Pt c/o a sore throat, body aches and chills for three days  History of Present Illness        29-year-old male complains of 3 days of cough congestion sore throat and body aches  No fever at home  Taking ibuprofen over-the-counter  No flu shot this year  No nausea or vomiting  Review of Systems   Review of Systems   Constitutional: Positive for chills  Negative for fever  HENT: Positive for congestion, postnasal drip, rhinorrhea and sore throat  Negative for ear pain, sinus pressure and sinus pain  Eyes: Negative for pain, redness and visual disturbance  Respiratory: Positive for cough  Negative for shortness of breath and wheezing  Cardiovascular: Negative for chest pain and palpitations  Gastrointestinal: Negative for abdominal pain, diarrhea, nausea and vomiting  Neurological: Negative for dizziness and headaches           Current Medications       Current Outpatient Medications:     brompheniramine-pseudoephedrine-DM 30-2-10 MG/5ML syrup, Take 5 mL by mouth 4 (four) times a day as needed for allergies, Disp: 118 mL, Rfl: 0    predniSONE 10 mg tablet, Days 1-2: 4 tablets daily Days 3-4: 3 tablets daily Days 5-6: 2 tablets daily Days 7-8: 1 tablet daily then stop (Patient not taking: Reported on 1/20/2020), Disp: 20 tablet, Rfl: 0    Current Allergies     Allergies as of 01/20/2020    (No Known Allergies)            The following portions of the patient's history were reviewed and updated as appropriate: allergies, current medications, past family history, past medical history, past social history, past surgical history and problem list      No past medical history on file  Past Surgical History:   Procedure Laterality Date    APPENDECTOMY      JOINT REPLACEMENT         No family history on file  Medications have been verified  Objective   /85   Pulse 103   Temp (!) 97 4 °F (36 3 °C)   Resp 18   SpO2 97%        Physical Exam     Physical Exam   Constitutional: He is oriented to person, place, and time  He appears well-developed and well-nourished  No distress  HENT:   Head: Normocephalic and atraumatic  Right Ear: Tympanic membrane, external ear and ear canal normal  Tympanic membrane is not erythematous, not retracted and not bulging  No middle ear effusion  Left Ear: Tympanic membrane, external ear and ear canal normal  Tympanic membrane is not erythematous, not retracted and not bulging  No middle ear effusion  Nose: Nose normal  No mucosal edema or rhinorrhea  Right sinus exhibits no maxillary sinus tenderness and no frontal sinus tenderness  Left sinus exhibits no maxillary sinus tenderness and no frontal sinus tenderness  Mouth/Throat: Mucous membranes are normal  Posterior oropharyngeal erythema present  No oropharyngeal exudate or posterior oropharyngeal edema  Eyes: Pupils are equal, round, and reactive to light  Conjunctivae and EOM are normal  Right eye exhibits no chemosis and no discharge  Left eye exhibits no chemosis and no discharge  Right conjunctiva is not injected  Left conjunctiva is not injected  Neck: Normal range of motion  Neck supple     Cardiovascular: Normal rate, regular rhythm and normal heart sounds  Pulmonary/Chest: Effort normal and breath sounds normal  No respiratory distress  He has no wheezes  He has no rales  Lymphadenopathy:     He has no cervical adenopathy  Right cervical: No superficial cervical adenopathy present  Left cervical: No superficial cervical adenopathy present  Neurological: He is alert and oriented to person, place, and time  No cranial nerve deficit  Skin: Skin is warm and dry  No rash noted

## 2020-01-20 NOTE — LETTER
January 20, 2020     Patient: Rosa Reveles  YOB: 1980   Date of Visit: 1/20/2020       To Whom it May Concern:    Rudi Ackerman is under my professional care  He was seen in my office on 1/20/2020  He may return to work on 1/21/20  If you have any questions or concerns, please don't hesitate to call  Sincerely,          Kimberly Bourne PA-C        CC: Matthew Anne

## 2020-01-20 NOTE — PATIENT INSTRUCTIONS
Negative rapid strep  Benign exam with no fever  Viral/ possible adenovirus  Bromfed for cough and congestion  Continue Tylenol and Motrin for body aches and fever  We will check a throat culture  Call in 2 days for the culture results

## 2020-01-21 ENCOUNTER — HOSPITAL ENCOUNTER (EMERGENCY)
Facility: HOSPITAL | Age: 40
Discharge: HOME | End: 2020-01-21
Attending: EMERGENCY MEDICINE

## 2020-01-21 VITALS
OXYGEN SATURATION: 99 % | DIASTOLIC BLOOD PRESSURE: 94 MMHG | BODY MASS INDEX: 30.85 KG/M2 | WEIGHT: 215 LBS | HEART RATE: 111 BPM | SYSTOLIC BLOOD PRESSURE: 156 MMHG | TEMPERATURE: 97.9 F | RESPIRATION RATE: 18 BRPM

## 2020-01-21 DIAGNOSIS — R68.89 FLU-LIKE SYMPTOMS: Primary | ICD-10-CM

## 2020-01-21 PROCEDURE — 99284 EMERGENCY DEPT VISIT MOD MDM: CPT | Performed by: PHYSICIAN ASSISTANT

## 2020-01-21 PROCEDURE — 99283 EMERGENCY DEPT VISIT LOW MDM: CPT

## 2020-01-21 RX ORDER — FLUTICASONE PROPIONATE 50 MCG
1 SPRAY, SUSPENSION (ML) NASAL DAILY
Qty: 16 G | Refills: 0 | Status: SHIPPED | OUTPATIENT
Start: 2020-01-21

## 2020-01-21 NOTE — DISCHARGE INSTRUCTIONS
Tylenol (2x 500mg) every 6 hrs as needed  Advil (3x 200mg) every 6 hrs as needed  Flonase once daily  Robitussin 4x daily as needed

## 2020-01-21 NOTE — ED PROVIDER NOTES
History  Chief Complaint   Patient presents with    Flu Symptoms     71-year-old male presents the emergency department complaining of 5 days of flu-like symptoms  He states that his fiancee is home sick with the flu as well  He complains of congestion intermittent subjective fevers chills generalized body aches and a cough  at this time he appears mildly uncomfortable due to his symptoms  He is awake alert oriented  He states that he has been tolerating normal p o  Intake and appears well-hydrated  He denies any other complaints  No flu shot this year  No known allergies          Prior to Admission Medications   Prescriptions Last Dose Informant Patient Reported? Taking?   brompheniramine-pseudoephedrine-DM 30-2-10 MG/5ML syrup   No No   Sig: Take 5 mL by mouth 4 (four) times a day as needed for allergies   predniSONE 10 mg tablet   No No   Sig: Days 1-2: 4 tablets daily  Days 3-4: 3 tablets daily  Days 5-6: 2 tablets daily  Days 7-8: 1 tablet daily then stop   Patient not taking: Reported on 1/20/2020      Facility-Administered Medications: None       History reviewed  No pertinent past medical history  Past Surgical History:   Procedure Laterality Date    APPENDECTOMY      FRACTURE SURGERY      left tibia with hardware       History reviewed  No pertinent family history  I have reviewed and agree with the history as documented  Social History     Tobacco Use    Smoking status: Current Every Day Smoker     Packs/day: 0 25    Smokeless tobacco: Never Used   Substance Use Topics    Alcohol use: No    Drug use: Yes     Types: Marijuana        Review of Systems   Constitutional: Positive for chills and fever  HENT: Positive for congestion and sore throat  Respiratory: Positive for cough  All other systems reviewed and are negative  Physical Exam  Physical Exam   Constitutional: He is oriented to person, place, and time  He appears well-developed and well-nourished   He is cooperative  He does not appear ill  No distress  HENT:   Head: Normocephalic and atraumatic  Right Ear: Hearing, tympanic membrane, external ear and ear canal normal    Left Ear: Hearing, tympanic membrane, external ear and ear canal normal    Nose: Nose normal    Mouth/Throat: Uvula is midline and oropharynx is clear and moist    Eyes: Pupils are equal, round, and reactive to light  EOM are normal    Neck: Normal range of motion  Neck supple  Cardiovascular: Normal rate, regular rhythm, S1 normal, S2 normal, normal heart sounds and intact distal pulses  Exam reveals no gallop and no friction rub  No murmur heard  Pulmonary/Chest: Effort normal and breath sounds normal  No stridor  No respiratory distress  He has no wheezes  He has no rales  Abdominal: Soft  Normal appearance and bowel sounds are normal  He exhibits no distension  There is no tenderness  There is no guarding  Musculoskeletal: Normal range of motion  He exhibits no tenderness  Neurological: He is alert and oriented to person, place, and time  Skin: Skin is warm  Capillary refill takes less than 2 seconds  Nursing note and vitals reviewed        Vital Signs  ED Triage Vitals [01/21/20 0912]   Temperature Pulse Respirations Blood Pressure SpO2   97 9 °F (36 6 °C) (!) 111 18 156/94 99 %      Temp Source Heart Rate Source Patient Position - Orthostatic VS BP Location FiO2 (%)   Temporal Monitor Sitting Left arm --      Pain Score       8           Vitals:    01/21/20 0912   BP: 156/94   Pulse: (!) 111   Patient Position - Orthostatic VS: Sitting         Visual Acuity      ED Medications  Medications - No data to display    Diagnostic Studies  Results Reviewed     None                 No orders to display              Procedures  Procedures         ED Course                               MDM  Number of Diagnoses or Management Options  Flu-like symptoms: new and does not require workup  Diagnosis management comments: Benign physical exam   Patient likely has the flu given his symptoms recent exposure and no flu shot this year  Patient was educated regarding medications he may utilize for symptomatic relief and the expected course of the flu  Elisabeth Lynn He denies needing prescriptions for Tylenol or ibuprofen at this time  Prescriptions for Flonase and Robitussin were provided for symptomatic relief  Work note was provided  At the time of discharge he is well-appearing in no acute distress  Patient educated regarding their diagnosis and given return and follow-up instructions  Patient is understanding and in agreement with the treatment plan  There are no questions at the time of discharge  Risk of Complications, Morbidity, and/or Mortality  Presenting problems: low  Diagnostic procedures: low  Management options: low    Patient Progress  Patient progress: stable        Disposition  Final diagnoses:   Flu-like symptoms     Time reflects when diagnosis was documented in both MDM as applicable and the Disposition within this note     Time User Action Codes Description Comment    1/21/2020  9:37 AM Alexandro Charles Add [R68 89] Flu-like symptoms       ED Disposition     ED Disposition Condition Date/Time Comment    Discharge Stable Tue Jan 21, 2020  9:37 AM Matthew Raphael  discharge to home/self care              Follow-up Information    None         Discharge Medication List as of 1/21/2020  9:42 AM      START taking these medications    Details   fluticasone (FLONASE) 50 mcg/act nasal spray 1 spray into each nostril daily, Starting Tue 1/21/2020, Normal      guaiFENesin (ROBITUSSIN) 100 MG/5ML oral liquid Take 10 mL (200 mg total) by mouth every 4 (four) hours as needed for cough, Starting Tue 1/21/2020, Normal         CONTINUE these medications which have NOT CHANGED    Details   brompheniramine-pseudoephedrine-DM 30-2-10 MG/5ML syrup Take 5 mL by mouth 4 (four) times a day as needed for allergies, Starting Mon 1/20/2020, Normal predniSONE 10 mg tablet Days 1-2: 4 tablets daily  Days 3-4: 3 tablets daily  Days 5-6: 2 tablets daily  Days 7-8: 1 tablet daily then stop, Print           No discharge procedures on file      ED Provider  Electronically Signed by           Maulik Veronica PA-C  01/21/20 5473

## 2020-01-22 LAB — BACTERIA THROAT CULT: NORMAL

## 2020-09-10 ENCOUNTER — HOSPITAL ENCOUNTER (EMERGENCY)
Facility: HOSPITAL | Age: 40
Discharge: HOME | End: 2020-09-10
Attending: EMERGENCY MEDICINE | Admitting: EMERGENCY MEDICINE
Payer: COMMERCIAL

## 2020-09-10 ENCOUNTER — APPOINTMENT (EMERGENCY)
Dept: RADIOLOGY | Facility: HOSPITAL | Age: 40
End: 2020-09-10
Payer: COMMERCIAL

## 2020-09-10 ENCOUNTER — APPOINTMENT (EMERGENCY)
Dept: CT IMAGING | Facility: HOSPITAL | Age: 40
End: 2020-09-10
Payer: COMMERCIAL

## 2020-09-10 VITALS
TEMPERATURE: 98.3 F | HEIGHT: 70 IN | HEART RATE: 62 BPM | DIASTOLIC BLOOD PRESSURE: 64 MMHG | OXYGEN SATURATION: 99 % | BODY MASS INDEX: 33.71 KG/M2 | WEIGHT: 235.45 LBS | RESPIRATION RATE: 18 BRPM | SYSTOLIC BLOOD PRESSURE: 118 MMHG

## 2020-09-10 DIAGNOSIS — R51.9 HEADACHE: Primary | ICD-10-CM

## 2020-09-10 DIAGNOSIS — R07.89 ATYPICAL CHEST PAIN: ICD-10-CM

## 2020-09-10 LAB
ALBUMIN SERPL BCP-MCNC: 4.2 G/DL (ref 3.5–5.7)
ALP SERPL-CCNC: 52 U/L (ref 40–150)
ALT SERPL W P-5'-P-CCNC: 64 U/L (ref 7–52)
ANION GAP SERPL CALCULATED.3IONS-SCNC: 9 MMOL/L (ref 4–13)
APTT PPP: 32 SECONDS (ref 23–37)
AST SERPL W P-5'-P-CCNC: 42 U/L (ref 13–39)
ATRIAL RATE: 87 BPM
BASOPHILS # BLD AUTO: 0.1 THOUSANDS/ΜL (ref 0–0.1)
BASOPHILS NFR BLD AUTO: 1 % (ref 0–2)
BILIRUB SERPL-MCNC: 0.6 MG/DL (ref 0.2–1)
BUN SERPL-MCNC: 11 MG/DL (ref 7–25)
CALCIUM SERPL-MCNC: 9.1 MG/DL (ref 8.6–10.5)
CHLORIDE SERPL-SCNC: 103 MMOL/L (ref 98–107)
CK MB SERPL-MCNC: 0.9 % (ref 0.6–6.3)
CK MB SERPL-MCNC: 3.9 NG/ML (ref 0.6–6.3)
CK SERPL-CCNC: 416 U/L (ref 30–308)
CO2 SERPL-SCNC: 24 MMOL/L (ref 21–31)
CREAT SERPL-MCNC: 1 MG/DL (ref 0.7–1.3)
CRP SERPL QL: <5 MG/L
EOSINOPHIL # BLD AUTO: 0.4 THOUSAND/ΜL (ref 0–0.61)
EOSINOPHIL NFR BLD AUTO: 6 % (ref 0–5)
ERYTHROCYTE [DISTWIDTH] IN BLOOD BY AUTOMATED COUNT: 13.8 % (ref 11.5–14.5)
ERYTHROCYTE [SEDIMENTATION RATE] IN BLOOD: 11 MM/HOUR (ref 0–14)
GFR SERPL CREATININE-BSD FRML MDRD: 109 ML/MIN/1.73SQ M
GLUCOSE SERPL-MCNC: 105 MG/DL (ref 65–99)
HCT VFR BLD AUTO: 44.5 % (ref 42–47)
HGB BLD-MCNC: 15.5 G/DL (ref 14–18)
INR PPP: 0.98 (ref 0.84–1.19)
LIPASE SERPL-CCNC: 128 U/L (ref 11–82)
LYMPHOCYTES # BLD AUTO: 2.9 THOUSANDS/ΜL (ref 0.6–4.47)
LYMPHOCYTES NFR BLD AUTO: 39 % (ref 21–51)
MAGNESIUM SERPL-MCNC: 2.3 MG/DL (ref 1.9–2.7)
MCH RBC QN AUTO: 32.4 PG (ref 26–34)
MCHC RBC AUTO-ENTMCNC: 34.9 G/DL (ref 31–37)
MCV RBC AUTO: 93 FL (ref 81–99)
MONOCYTES # BLD AUTO: 0.8 THOUSAND/ΜL (ref 0.17–1.22)
MONOCYTES NFR BLD AUTO: 11 % (ref 2–12)
NEUTROPHILS # BLD AUTO: 3.2 THOUSANDS/ΜL (ref 1.4–6.5)
NEUTS SEG NFR BLD AUTO: 44 % (ref 42–75)
P AXIS: 54 DEGREES
PLATELET # BLD AUTO: 323 THOUSANDS/UL (ref 149–390)
PMV BLD AUTO: 7.6 FL (ref 8.6–11.7)
POTASSIUM SERPL-SCNC: 4 MMOL/L (ref 3.5–5.5)
PR INTERVAL: 134 MS
PROT SERPL-MCNC: 6.8 G/DL (ref 6.4–8.9)
PROTHROMBIN TIME: 12.8 SECONDS (ref 11.6–14.5)
QRS AXIS: 53 DEGREES
QRSD INTERVAL: 94 MS
QT INTERVAL: 400 MS
QTC INTERVAL: 481 MS
RBC # BLD AUTO: 4.79 MILLION/UL (ref 4.3–5.9)
SODIUM SERPL-SCNC: 136 MMOL/L (ref 134–143)
T WAVE AXIS: 8 DEGREES
TROPONIN I SERPL-MCNC: <0.03 NG/ML
VENTRICULAR RATE: 87 BPM
WBC # BLD AUTO: 7.3 THOUSAND/UL (ref 4.8–10.8)

## 2020-09-10 PROCEDURE — 84484 ASSAY OF TROPONIN QUANT: CPT | Performed by: EMERGENCY MEDICINE

## 2020-09-10 PROCEDURE — 80053 COMPREHEN METABOLIC PANEL: CPT | Performed by: EMERGENCY MEDICINE

## 2020-09-10 PROCEDURE — 82553 CREATINE MB FRACTION: CPT | Performed by: EMERGENCY MEDICINE

## 2020-09-10 PROCEDURE — 85730 THROMBOPLASTIN TIME PARTIAL: CPT | Performed by: EMERGENCY MEDICINE

## 2020-09-10 PROCEDURE — 85025 COMPLETE CBC W/AUTO DIFF WBC: CPT | Performed by: EMERGENCY MEDICINE

## 2020-09-10 PROCEDURE — 36415 COLL VENOUS BLD VENIPUNCTURE: CPT | Performed by: EMERGENCY MEDICINE

## 2020-09-10 PROCEDURE — 85610 PROTHROMBIN TIME: CPT | Performed by: EMERGENCY MEDICINE

## 2020-09-10 PROCEDURE — 99285 EMERGENCY DEPT VISIT HI MDM: CPT

## 2020-09-10 PROCEDURE — 70496 CT ANGIOGRAPHY HEAD: CPT

## 2020-09-10 PROCEDURE — 93010 ELECTROCARDIOGRAM REPORT: CPT | Performed by: INTERNAL MEDICINE

## 2020-09-10 PROCEDURE — 93005 ELECTROCARDIOGRAM TRACING: CPT

## 2020-09-10 PROCEDURE — G1004 CDSM NDSC: HCPCS

## 2020-09-10 PROCEDURE — 83735 ASSAY OF MAGNESIUM: CPT | Performed by: EMERGENCY MEDICINE

## 2020-09-10 PROCEDURE — 86140 C-REACTIVE PROTEIN: CPT | Performed by: EMERGENCY MEDICINE

## 2020-09-10 PROCEDURE — 70498 CT ANGIOGRAPHY NECK: CPT

## 2020-09-10 PROCEDURE — 71045 X-RAY EXAM CHEST 1 VIEW: CPT

## 2020-09-10 PROCEDURE — 82550 ASSAY OF CK (CPK): CPT | Performed by: EMERGENCY MEDICINE

## 2020-09-10 PROCEDURE — 96361 HYDRATE IV INFUSION ADD-ON: CPT

## 2020-09-10 PROCEDURE — 83690 ASSAY OF LIPASE: CPT | Performed by: EMERGENCY MEDICINE

## 2020-09-10 PROCEDURE — 96374 THER/PROPH/DIAG INJ IV PUSH: CPT

## 2020-09-10 PROCEDURE — 96375 TX/PRO/DX INJ NEW DRUG ADDON: CPT

## 2020-09-10 PROCEDURE — 99285 EMERGENCY DEPT VISIT HI MDM: CPT | Performed by: EMERGENCY MEDICINE

## 2020-09-10 PROCEDURE — 85652 RBC SED RATE AUTOMATED: CPT | Performed by: EMERGENCY MEDICINE

## 2020-09-10 RX ORDER — DIPHENHYDRAMINE HYDROCHLORIDE 50 MG/ML
50 INJECTION INTRAMUSCULAR; INTRAVENOUS ONCE
Status: COMPLETED | OUTPATIENT
Start: 2020-09-10 | End: 2020-09-10

## 2020-09-10 RX ORDER — METOCLOPRAMIDE HYDROCHLORIDE 5 MG/ML
10 INJECTION INTRAMUSCULAR; INTRAVENOUS ONCE
Status: COMPLETED | OUTPATIENT
Start: 2020-09-10 | End: 2020-09-10

## 2020-09-10 RX ADMIN — DIPHENHYDRAMINE HYDROCHLORIDE 50 MG: 50 INJECTION INTRAMUSCULAR; INTRAVENOUS at 04:41

## 2020-09-10 RX ADMIN — METOCLOPRAMIDE 10 MG: 5 INJECTION, SOLUTION INTRAMUSCULAR; INTRAVENOUS at 04:41

## 2020-09-10 RX ADMIN — SODIUM CHLORIDE 1000 ML: 0.9 INJECTION, SOLUTION INTRAVENOUS at 04:41

## 2020-09-10 RX ADMIN — IOHEXOL 170 ML: 350 INJECTION, SOLUTION INTRAVENOUS at 06:28

## 2020-09-10 NOTE — ED CARE HANDOFF
Emergency Department Sign Out Note        Sign out and transfer of care from Dr Farooq Acosta  See Separate Emergency Department note  The patient, Sachin Oakes , was evaluated by the previous provider for headache  Workup Completed:  Labs and CTA head and neck  ED Course / Workup Pending (followup): Patient is awake alert oriented x3  Patient headache resolve  He does complain of back pain where LP was attempted  No swelling is noted no bleeding is noted at the site  Recommended to avoid heavy lifting until pain is improved  Patient is refusing any further pain medication  HEART Risk Score      Most Recent Value   Heart Score Risk Calculator   History  0 Filed at: 09/10/2020 0526   ECG  0 Filed at: 09/10/2020 0526   Age  0 Filed at: 09/10/2020 0526   Risk Factors  0 Filed at: 09/10/2020 0526   Troponin  0 Filed at: 09/10/2020 0526   HEART Score  0 Filed at: 09/10/2020 8713                          ED Course as of Sep 10 0841   Thu Sep 10, 2020   0831 Pt headache resolved  States it only hurts where LP was attempted  Patient is comfortable going home  Recommending avoid heavy lifting due to back pain  Procedures  MDM    Disposition  Final diagnoses:   Headache   Atypical chest pain     Time reflects when diagnosis was documented in both MDM as applicable and the Disposition within this note     Time User Action Codes Description Comment    9/10/2020  8:39 AM Zenia Heady Add [R51] Headache     9/10/2020  8:39 AM Tioga Heady Add [R07 89] Atypical chest pain       ED Disposition     ED Disposition Condition Date/Time Comment    Discharge Stable Thu Sep 10, 2020  8:39 AM Matthew Galicia Dopjay  discharge to home/self care              Follow-up Information    None       Patient's Medications   Discharge Prescriptions    No medications on file            ED Provider  Electronically Signed by     Milena Kennedy MD  09/10/20 1266

## 2020-09-10 NOTE — ED PROCEDURE NOTE
PROCEDURE  ECG 12 Lead Documentation Only    Date/Time: 9/10/2020 4:33 AM  Performed by: Chuck Dangelo MD  Authorized by: Chuck Dangelo MD     Indications / Diagnosis:  Cp   ECG reviewed by me, the ED Provider: yes    Patient location:  ED  Previous ECG:     Comparison to cardiac monitor: Yes    Interpretation:     Interpretation: normal    Rate:     ECG rate:  87    ECG rate assessment: normal    Rhythm:     Rhythm: sinus rhythm    Ectopy:     Ectopy: none    QRS:     QRS axis:  Normal  Conduction:     Conduction: normal    ST segments:     ST segments:  Non-specific    Elevation:  V2 and V3  T waves:     T waves: non-specific    Comments:      J point elevation in V2 and V3, c/w  ELIA Dangelo MD  09/10/20 244 [facility] Ministerio Delaeny MD  09/10/20 0296

## 2020-09-10 NOTE — ED PROVIDER NOTES
History  Chief Complaint   Patient presents with    Chest Pain     Pt states he started with HA and CP Tuesday night  Pt states when his head start to increase in pain, he gets CP  Pt has taken nothing for it   Headache       45YEAR-OLD MALE    PMH: none  Soc: Non smoker, no drugs  Fhx: non contributory      Chief complaint:   Migraine like Headache x 2 days  CP with vomiting     HPI:   Patient states that since Tuesday he has had severe headache  States it has caused him to vomit several times  States that during the vomiting he has had chest discomfort    He does not have chest discomfort and the other times  He denies any shortness of breath or pleurisy    Denies neck stiffness or pain    He denies fevers or chills    Patient does not have history of headaches      Patient reports LIGHT SENSITIVITY      NO TRAUMA  NO PT HX OR FAM HX OF ANEURYSM    Patient denies that the pain came on  WITH EXERTION   He does state that the pain was rather sudden  He does states that the pain is the worst headache he has ever had      INTERVENTIONS:   None      PRIOR WORK UP:   HE HAS NEVER HAD PRIOR MRI TO EVALUATE HER HEADACHE  HE DOES NOT FOLLOW W/ NEUROLOGY      OTHER ASSOCIATED SYMPTOMS:  HE DOES NOT HAVE ANY ABDOMINAL PAIN ASSOCIATED  URINARY  SYMPTOMS: THERE IS NO DYSURIA, NO HEMATURIA, NO FREQUENCY   NO STOOL CHANGES      ALLEVIATING OR EXACERBATING FACTORS:  UNCERTAIN          History provided by:  Patient  Headache   Pain location:  R temporal  Severity currently:  9/10  Severity at highest:  10/10  Onset quality:  Sudden  Timing:  Constant  Progression:  Unchanged  Relieved by:  Nothing  Worsened by:  Nothing  Ineffective treatments:  None tried  Associated symptoms: no abdominal pain, no back pain, no blurred vision, no congestion, no cough, no diarrhea, no dizziness, no drainage, no eye pain, no facial pain, no fatigue, no fever, no focal weakness, no hearing loss, no myalgias, no nausea, no neck pain, no neck stiffness, no URI, no visual change and no vomiting        Prior to Admission Medications   Prescriptions Last Dose Informant Patient Reported? Taking?   brompheniramine-pseudoephedrine-DM 30-2-10 MG/5ML syrup Not Taking at Unknown time  No No   Sig: Take 5 mL by mouth 4 (four) times a day as needed for allergies   Patient not taking: Reported on 9/10/2020   fluticasone (FLONASE) 50 mcg/act nasal spray Not Taking at Unknown time  No No   Si spray into each nostril daily   Patient not taking: Reported on 9/10/2020   guaiFENesin (ROBITUSSIN) 100 MG/5ML oral liquid Not Taking at Unknown time  No No   Sig: Take 10 mL (200 mg total) by mouth every 4 (four) hours as needed for cough   Patient not taking: Reported on 9/10/2020   predniSONE 10 mg tablet Not Taking at Unknown time  No No   Sig: Days 1-2: 4 tablets daily  Days 3-4: 3 tablets daily  Days 5-6: 2 tablets daily  Days 7-8: 1 tablet daily then stop   Patient not taking: Reported on 2020      Facility-Administered Medications: None       History reviewed  No pertinent past medical history  Past Surgical History:   Procedure Laterality Date    APPENDECTOMY      FRACTURE SURGERY      left tibia with hardware       History reviewed  No pertinent family history  I have reviewed and agree with the history as documented  E-Cigarette/Vaping    E-Cigarette Use Never User      E-Cigarette/Vaping Substances     Social History     Tobacco Use    Smoking status: Current Every Day Smoker     Packs/day: 0 50    Smokeless tobacco: Never Used   Substance Use Topics    Alcohol use: No    Drug use: Yes     Types: Marijuana     Comment: Occasional       Review of Systems   Constitutional: Negative for chills, diaphoresis, fatigue and fever  HENT: Negative for congestion, hearing loss and postnasal drip  Eyes: Negative for blurred vision and pain  Respiratory: Negative for cough, shortness of breath, wheezing and stridor      Cardiovascular: Positive for chest pain (after vomiting)  Negative for palpitations and leg swelling  Gastrointestinal: Negative for abdominal pain, blood in stool, diarrhea, nausea and vomiting  Genitourinary: Negative for difficulty urinating, dysuria, flank pain and frequency  Musculoskeletal: Negative for arthralgias, back pain, gait problem, joint swelling, myalgias, neck pain and neck stiffness  Skin: Negative for rash and wound  Neurological: Positive for headaches  Negative for dizziness, focal weakness and light-headedness  All other systems reviewed and are negative  Physical Exam  Physical Exam  Constitutional:       General: He is not in acute distress  Appearance: He is well-developed  He is not ill-appearing, toxic-appearing or diaphoretic  HENT:      Head: Normocephalic and atraumatic  Nose: Nose normal    Eyes:      General: No scleral icterus  Right eye: No discharge  Left eye: No discharge  Conjunctiva/sclera: Conjunctivae normal       Pupils: Pupils are equal, round, and reactive to light  Neck:      Musculoskeletal: Normal range of motion and neck supple  No neck rigidity  Vascular: No JVD  Trachea: No tracheal deviation  Cardiovascular:      Rate and Rhythm: Normal rate and regular rhythm  Pulses:           Carotid pulses are 2+ on the right side and 2+ on the left side  Radial pulses are 2+ on the right side and 2+ on the left side  Heart sounds: Normal heart sounds  No murmur  No friction rub  No gallop  Pulmonary:      Effort: Pulmonary effort is normal  No tachypnea, accessory muscle usage or respiratory distress  Breath sounds: Normal breath sounds  No stridor  No decreased breath sounds, wheezing, rhonchi or rales  Chest:      Chest wall: No tenderness  Abdominal:      General: Bowel sounds are normal  There is no distension  Palpations: Abdomen is soft  There is no mass  Tenderness: There is no abdominal tenderness  There is no right CVA tenderness, left CVA tenderness, guarding or rebound  Hernia: No hernia is present  Musculoskeletal: Normal range of motion  General: No swelling, tenderness, deformity or signs of injury  Right lower leg: He exhibits no tenderness  No edema  Left lower leg: He exhibits no tenderness  No edema  Lymphadenopathy:      Cervical: No cervical adenopathy  Skin:     General: Skin is warm  Capillary Refill: Capillary refill takes less than 2 seconds  Coloration: Skin is not cyanotic, jaundiced or pale  Findings: No bruising, ecchymosis, erythema, lesion or rash  Neurological:      Mental Status: He is alert and oriented to person, place, and time  Cranial Nerves: No cranial nerve deficit  Sensory: No sensory deficit  Motor: No weakness or abnormal muscle tone  Coordination: Coordination normal    Psychiatric:         Behavior: Behavior normal          Thought Content:  Thought content normal          Judgment: Judgment normal          Vital Signs  ED Triage Vitals   Temperature Pulse Respirations Blood Pressure SpO2   09/10/20 0414 09/10/20 0414 09/10/20 0414 09/10/20 0414 09/10/20 0414   98 3 °F (36 8 °C) 85 18 127/72 99 %      Temp Source Heart Rate Source Patient Position - Orthostatic VS BP Location FiO2 (%)   09/10/20 0414 09/10/20 0414 09/10/20 0414 09/10/20 0414 --   Temporal Monitor Lying Left arm       Pain Score       09/10/20 0424       7           Vitals:    09/10/20 0515 09/10/20 0530 09/10/20 0545 09/10/20 0630   BP:  95/51 122/60 104/63   Pulse: 67 80 72 64   Patient Position - Orthostatic VS:             Visual Acuity  Visual Acuity      Most Recent Value   L Pupil Size (mm)  3   R Pupil Size (mm)  3          ED Medications  Medications   sodium chloride 0 9 % bolus 1,000 mL (0 mL Intravenous Stopped 9/10/20 0602)   metoclopramide (REGLAN) injection 10 mg (10 mg Intravenous Given 9/10/20 0441)   diphenhydrAMINE (BENADRYL) injection 50 mg (50 mg Intravenous Given 9/10/20 0441)   iohexol (OMNIPAQUE) 350 MG/ML injection (SINGLE-DOSE) 170 mL (170 mL Intravenous Given 9/10/20 0628)       Diagnostic Studies  Results Reviewed     Procedure Component Value Units Date/Time    CKMB [[id number]]  (Normal) Collected:  09/10/20 0433    Lab Status:  Final result Specimen:  Blood from Arm, Left Updated:  09/10/20 0559     CK-MB Index 0 9 %      CK-MB 3 9 ng/mL     Sedimentation rate, automated [[id number]]  (Normal) Collected:  09/10/20 0433    Lab Status:  Final result Specimen:  Blood from Arm, Left Updated:  09/10/20 0532     Sed Rate 11 mm/hour     Narrative:       New method- Test performed using  automated Rheology Technology  If following a patient's inflammatory disease during treatment, a new baseline should be established      Protime-INR [[id number]]  (Normal) Collected:  09/10/20 0433    Lab Status:  Final result Specimen:  Blood from Arm, Left Updated:  09/10/20 0530     Protime 12 8 seconds      INR 0 98    APTT [[id number]]  (Normal) Collected:  09/10/20 0433    Lab Status:  Final result Specimen:  Blood from Arm, Left Updated:  09/10/20 0530     PTT 32 seconds     C-reactive protein [[id number]]  (Normal) Collected:  09/10/20 0433    Lab Status:  Final result Specimen:  Blood from Arm, Left Updated:  09/10/20 0528     CRP <5 0 mg/L     Comprehensive metabolic panel [[id number]]  (Abnormal) Collected:  09/10/20 0433    Lab Status:  Final result Specimen:  Blood from Arm, Left Updated:  09/10/20 0513     Sodium 136 mmol/L      Potassium 4 0 mmol/L      Chloride 103 mmol/L      CO2 24 mmol/L      ANION GAP 9 mmol/L      BUN 11 mg/dL      Creatinine 1 00 mg/dL      Glucose 105 mg/dL      Calcium 9 1 mg/dL      AST 42 U/L      ALT 64 U/L      Alkaline Phosphatase 52 U/L      Total Protein 6 8 g/dL      Albumin 4 2 g/dL      Total Bilirubin 0 60 mg/dL      eGFR 109 ml/min/1 73sq m     Narrative:       [place] guidelines for Chronic Kidney Disease (CKD):     Stage 1 with normal or high GFR (GFR > 90 mL/min/1 73 square meters)    Stage 2 Mild CKD (GFR = 60-89 mL/min/1 73 square meters)    Stage 3A Moderate CKD (GFR = 45-59 mL/min/1 73 square meters)    Stage 3B Moderate CKD (GFR = 30-44 mL/min/1 73 square meters)    Stage 4 Severe CKD (GFR = 15-29 mL/min/1 73 square meters)    Stage 5 End Stage CKD (GFR <15 mL/min/1 73 square meters)  Note: GFR calculation is accurate only with a steady state creatinine    CK Total with Reflex CKMB [[id number]]  (Abnormal) Collected:  09/10/20 0433    Lab Status:  Final result Specimen:  Blood from Arm, Left Updated:  09/10/20 0513     Total  U/L     Lipase [[id number]]  (Abnormal) Collected:  09/10/20 0433    Lab Status:  Final result Specimen:  Blood from Arm, Left Updated:  09/10/20 0513     Lipase 128 u/L     Magnesium [[id number]]  (Normal) Collected:  09/10/20 0433    Lab Status:  Final result Specimen:  Blood from Arm, Left Updated:  09/10/20 0512     Magnesium 2 3 mg/dL     Troponin I [[id number]]  (Normal) Collected:  09/10/20 0433    Lab Status:  Final result Specimen:  Blood from Arm, Left Updated:  09/10/20 0512     Troponin I <0 03 ng/mL     CBC and differential [[id number]]  (Abnormal) Collected:  09/10/20 0433    Lab Status:  Final result Specimen:  Blood from Arm, Left Updated:  09/10/20 0503     WBC 7 30 Thousand/uL      RBC 4 79 Million/uL      Hemoglobin 15 5 g/dL      Hematocrit 44 5 %      MCV 93 fL      MCH 32 4 pg      MCHC 34 9 g/dL      RDW 13 8 %      MPV 7 6 fL      Platelets 534 Thousands/uL      Neutrophils Relative 44 %      Lymphocytes Relative 39 %      Monocytes Relative 11 %      Eosinophils Relative 6 %      Basophils Relative 1 %      Neutrophils Absolute 3 20 Thousands/µL      Lymphocytes Absolute 2 90 Thousands/µL      Monocytes Absolute 0 80 Thousand/µL      Eosinophils Absolute 0 40 Thousand/µL      Basophils Absolute 0 10 Thousands/µL                  CTA head and neck with and without contrast   Final Result by Joon Lovell DO (09/10 2994)   1  Unremarkable CT scan of the brain  2   Unremarkable CTA of the neck and brain  3   Mild pansinus disease  Workstation performed: [id number]         XR chest 1 view portable   ED Interpretation by Jessika Crowe MD (09/10 Amalia Sierra)     EXAM PERFORMED/VIEWS:  XR CHEST PORTABLE        FINDINGS:     Cardiomediastinal silhouette appears unremarkable      The lungs are clear  No pneumothorax or pleural effusion      Visualized osseous structures appear unremarkable for the patient's age      IMPRESSION:     No focal consolidation, pleural effusion, or pneumothorax                         Procedures  Procedures         ED Course  ED Course as of Sep 10 0713   [facility] Sep 10, 2020   0525 Troponin I: <0 03   0525 Sodium: 136   0526 Potassium: 4 0   0526 Chloride: 103   0526 CO2: 24   0526 Anion Gap: 9   0526 BUN: 11   0526 Creatinine: 1 00   0526 AST(!): 42   0526 ALT(!): 64   0526 Alkaline Phosphatase: 52   0526 TOTAL BILIRUBIN: 0 60   0526 Total CK(!): 416   0605 Discussed with CT tech  Patient will need to have CT scan repeated, the scan was reviewed and IV contrast timing was off       Patient's renal function is more than adequate for a re-bolus and repeat of CT scan       0624 CREATINE KINASE-MB FRACTION: 3 9   0624 CREATINE KINASE-MB INDEX: 0 9   0624 Sed Rate: 11   0624 PTT: 32   0624 C-REACTIVE PROTEIN: <5 0   0625 WBC: 7 30   0625 Hemoglobin: 15 5   0625 HCT: 44 5   0625 Platelet Count: 835   0625 Neutrophils %: 44   0625 Lymphocytes Relative: 39   0625 Monocytes Relative: 11   0625 Eosinophils(!): 6   0625 Magnesium: 2 3   0625 INR: 0 98   0625 Sodium: 136   0625 Potassium: 4 0   0625 Chloride: 103   0625 CO2: 24   0625 Anion Gap: 9   0625 BUN: 11   0625 Creatinine: 1 00   0625 Glucose, Random(!): 105   0625 AST(!): 42   0625 ALT(!): 64   0625 Alkaline Phosphatase: 52   0626 Labs are very reassuring      0636 I reviewed the CT scans  No gross abnormalities    I discussed with the patient indications for LP  He states he did have worse headache of his life  I offered lumbar puncture to further evaluate  He is agreeable    He understands risks and benefits      0643 LP attempted, uncessessful   Will have       0709 CTA NECK AND BRAIN WITH AND WITHOUT CONTRAST       IMPRESSION:  1  Unremarkable CT scan of the brain  2   Unremarkable CTA of the neck and brain  3   Mild pansinus disease  6939 Sign out:     Dr Brett Garzon to arrange LP for the pt  She will assist w/ Disposition of the pt                HEART Risk Score      Most Recent Value   Heart Score Risk Calculator   History  0 Filed at: 09/10/2020 0526   ECG  0 Filed at: 09/10/2020 0526   Age  0 Filed at: 09/10/2020 0526   Risk Factors  0 Filed at: 09/10/2020 0526   Troponin  0 Filed at: 09/10/2020 0526   HEART Score  0 Filed at: 09/10/2020 0596                                  MDM    Disposition  Final diagnoses:   None     ED Disposition     None      Follow-up Information    None         Patient's Medications   Discharge Prescriptions    No medications on file     No discharge procedures on file      PDMP Review     None          ED Provider  Electronically Signed by           Yen Herrera MD  09/10/20 8015

## 2020-09-10 NOTE — Clinical Note
Scar Redmond was seen and treated in our emergency department on 9/10/2020  Diagnosis:     Vesna Werner  may return to work on return date  He may return on this date: 09/11/2020         If you have any questions or concerns, please don't hesitate to call        Gayatir Friend MD    ______________________________           _______________          _______________  Hospital Representative                              Date                                Time